# Patient Record
Sex: FEMALE | Race: BLACK OR AFRICAN AMERICAN | NOT HISPANIC OR LATINO
[De-identification: names, ages, dates, MRNs, and addresses within clinical notes are randomized per-mention and may not be internally consistent; named-entity substitution may affect disease eponyms.]

---

## 2017-03-02 ENCOUNTER — RX RENEWAL (OUTPATIENT)
Age: 42
End: 2017-03-02

## 2017-03-07 ENCOUNTER — APPOINTMENT (OUTPATIENT)
Dept: CARDIOLOGY | Facility: CLINIC | Age: 42
End: 2017-03-07

## 2017-03-07 ENCOUNTER — NON-APPOINTMENT (OUTPATIENT)
Age: 42
End: 2017-03-07

## 2017-03-07 VITALS — SYSTOLIC BLOOD PRESSURE: 111 MMHG | OXYGEN SATURATION: 96 % | DIASTOLIC BLOOD PRESSURE: 67 MMHG | HEART RATE: 89 BPM

## 2017-03-07 VITALS — BODY MASS INDEX: 27.49 KG/M2 | WEIGHT: 165 LBS | HEIGHT: 65 IN

## 2017-03-09 LAB
ALBUMIN SERPL ELPH-MCNC: 4.3 G/DL
ALP BLD-CCNC: 99 U/L
ALT SERPL-CCNC: 35 U/L
ANION GAP SERPL CALC-SCNC: 22 MMOL/L
AST SERPL-CCNC: 32 U/L
BILIRUB SERPL-MCNC: 0.6 MG/DL
BUN SERPL-MCNC: 11 MG/DL
CALCIUM SERPL-MCNC: 9.9 MG/DL
CHLORIDE SERPL-SCNC: 102 MMOL/L
CHOLEST SERPL-MCNC: 110 MG/DL
CHOLEST/HDLC SERPL: 2.2 RATIO
CK SERPL-CCNC: 89 U/L
CO2 SERPL-SCNC: 19 MMOL/L
CREAT SERPL-MCNC: 0.88 MG/DL
GLUCOSE SERPL-MCNC: 78 MG/DL
HDLC SERPL-MCNC: 51 MG/DL
LDLC SERPL CALC-MCNC: 19 MG/DL
POTASSIUM SERPL-SCNC: 4.1 MMOL/L
PROT SERPL-MCNC: 7.4 G/DL
SODIUM SERPL-SCNC: 143 MMOL/L
TRIGL SERPL-MCNC: 201 MG/DL

## 2017-07-28 ENCOUNTER — RX RENEWAL (OUTPATIENT)
Age: 42
End: 2017-07-28

## 2017-11-26 ENCOUNTER — RX RENEWAL (OUTPATIENT)
Age: 42
End: 2017-11-26

## 2018-01-30 ENCOUNTER — RX RENEWAL (OUTPATIENT)
Age: 43
End: 2018-01-30

## 2018-02-22 ENCOUNTER — RX RENEWAL (OUTPATIENT)
Age: 43
End: 2018-02-22

## 2018-03-20 ENCOUNTER — NON-APPOINTMENT (OUTPATIENT)
Age: 43
End: 2018-03-20

## 2018-03-20 ENCOUNTER — APPOINTMENT (OUTPATIENT)
Dept: CARDIOLOGY | Facility: CLINIC | Age: 43
End: 2018-03-20
Payer: MEDICAID

## 2018-03-20 VITALS
OXYGEN SATURATION: 98 % | HEART RATE: 83 BPM | BODY MASS INDEX: 27.49 KG/M2 | DIASTOLIC BLOOD PRESSURE: 82 MMHG | RESPIRATION RATE: 14 BRPM | HEIGHT: 65 IN | SYSTOLIC BLOOD PRESSURE: 114 MMHG | WEIGHT: 165 LBS

## 2018-03-20 DIAGNOSIS — R07.9 CHEST PAIN, UNSPECIFIED: ICD-10-CM

## 2018-03-20 PROCEDURE — 99215 OFFICE O/P EST HI 40 MIN: CPT

## 2018-03-20 PROCEDURE — 93000 ELECTROCARDIOGRAM COMPLETE: CPT

## 2018-03-22 ENCOUNTER — CHART COPY (OUTPATIENT)
Age: 43
End: 2018-03-22

## 2018-03-22 LAB
ALBUMIN SERPL ELPH-MCNC: 4.5 G/DL
ALP BLD-CCNC: 100 U/L
ALT SERPL-CCNC: 25 U/L
ANION GAP SERPL CALC-SCNC: 18 MMOL/L
AST SERPL-CCNC: 18 U/L
BILIRUB SERPL-MCNC: 0.4 MG/DL
BUN SERPL-MCNC: 9 MG/DL
CALCIUM SERPL-MCNC: 10.3 MG/DL
CHLORIDE SERPL-SCNC: 99 MMOL/L
CHOLEST SERPL-MCNC: 215 MG/DL
CHOLEST/HDLC SERPL: 3.9 RATIO
CO2 SERPL-SCNC: 22 MMOL/L
CREAT SERPL-MCNC: 0.8 MG/DL
GLUCOSE SERPL-MCNC: 57 MG/DL
HBA1C MFR BLD HPLC: 5.4 %
HDLC SERPL-MCNC: 55 MG/DL
LDLC SERPL CALC-MCNC: 102 MG/DL
POTASSIUM SERPL-SCNC: 4 MMOL/L
PROT SERPL-MCNC: 8.3 G/DL
SODIUM SERPL-SCNC: 139 MMOL/L
TRIGL SERPL-MCNC: 292 MG/DL
TSH SERPL-ACNC: 0.97 UIU/ML

## 2018-08-28 ENCOUNTER — RX RENEWAL (OUTPATIENT)
Age: 43
End: 2018-08-28

## 2018-09-07 ENCOUNTER — RX RENEWAL (OUTPATIENT)
Age: 43
End: 2018-09-07

## 2018-09-11 ENCOUNTER — CHART COPY (OUTPATIENT)
Age: 43
End: 2018-09-11

## 2018-10-07 ENCOUNTER — RX RENEWAL (OUTPATIENT)
Age: 43
End: 2018-10-07

## 2018-10-09 ENCOUNTER — RX RENEWAL (OUTPATIENT)
Age: 43
End: 2018-10-09

## 2018-10-09 ENCOUNTER — CHART COPY (OUTPATIENT)
Age: 43
End: 2018-10-09

## 2018-10-19 ENCOUNTER — APPOINTMENT (OUTPATIENT)
Dept: CARDIOLOGY | Facility: CLINIC | Age: 43
End: 2018-10-19
Payer: MEDICAID

## 2018-10-19 VITALS
BODY MASS INDEX: 27.99 KG/M2 | WEIGHT: 168 LBS | DIASTOLIC BLOOD PRESSURE: 88 MMHG | OXYGEN SATURATION: 97 % | SYSTOLIC BLOOD PRESSURE: 133 MMHG | HEIGHT: 65 IN | HEART RATE: 72 BPM

## 2018-10-19 PROCEDURE — 93000 ELECTROCARDIOGRAM COMPLETE: CPT

## 2018-10-19 PROCEDURE — 99215 OFFICE O/P EST HI 40 MIN: CPT

## 2018-10-19 RX ORDER — CLOPIDOGREL BISULFATE 75 MG/1
75 TABLET, FILM COATED ORAL DAILY
Qty: 90 | Refills: 3 | Status: DISCONTINUED | COMMUNITY
Start: 2018-02-22 | End: 2018-10-19

## 2019-01-04 ENCOUNTER — RX RENEWAL (OUTPATIENT)
Age: 44
End: 2019-01-04

## 2019-03-19 ENCOUNTER — RX RENEWAL (OUTPATIENT)
Age: 44
End: 2019-03-19

## 2019-06-18 ENCOUNTER — RX RENEWAL (OUTPATIENT)
Age: 44
End: 2019-06-18

## 2019-09-12 ENCOUNTER — RX RENEWAL (OUTPATIENT)
Age: 44
End: 2019-09-12

## 2019-09-16 ENCOUNTER — RX RENEWAL (OUTPATIENT)
Age: 44
End: 2019-09-16

## 2020-09-26 ENCOUNTER — RX RENEWAL (OUTPATIENT)
Age: 45
End: 2020-09-26

## 2021-02-22 NOTE — PHYSICAL EXAM
[General Appearance - Well Developed] : well developed [Normal Appearance] : normal appearance [Well Groomed] : well groomed [General Appearance - Well Nourished] : well nourished [No Deformities] : no deformities [General Appearance - In No Acute Distress] : no acute distress [Normal Oral Mucosa] : normal oral mucosa [No Oral Pallor] : no oral pallor [No Oral Cyanosis] : no oral cyanosis [Normal Jugular Venous A Waves Present] : normal jugular venous A waves present [Normal Jugular Venous V Waves Present] : normal jugular venous V waves present [No Jugular Venous Garcia A Waves] : no jugular venous garcia A waves [Respiration, Rhythm And Depth] : normal respiratory rhythm and effort [Exaggerated Use Of Accessory Muscles For Inspiration] : no accessory muscle use [Auscultation Breath Sounds / Voice Sounds] : lungs were clear to auscultation bilaterally [Heart Rate And Rhythm] : heart rate and rhythm were normal [Heart Sounds] : normal S1 and S2 [Murmurs] : no murmurs present [Abdomen Soft] : soft [Abdomen Tenderness] : non-tender [Abdomen Mass (___ Cm)] : no abdominal mass palpated [Abnormal Walk] : normal gait [Gait - Sufficient For Exercise Testing] : the gait was sufficient for exercise testing [Nail Clubbing] : no clubbing of the fingernails [Cyanosis, Localized] : no localized cyanosis [Petechial Hemorrhages (___cm)] : no petechial hemorrhages [] : no rash [Skin Color & Pigmentation] : normal skin color and pigmentation [No Venous Stasis] : no venous stasis [Skin Lesions] : no skin lesions [No Skin Ulcers] : no skin ulcer [No Xanthoma] : no  xanthoma was observed [Oriented To Time, Place, And Person] : oriented to person, place, and time [Affect] : the affect was normal [Mood] : the mood was normal [No Anxiety] : not feeling anxious

## 2021-02-23 ENCOUNTER — APPOINTMENT (OUTPATIENT)
Dept: CARDIOLOGY | Facility: CLINIC | Age: 46
End: 2021-02-23

## 2021-02-23 NOTE — HISTORY OF PRESENT ILLNESS
[FreeTextEntry1] : Here for followup. Last seen in March 2018. Doing well without complaints. Still smoking, knows she should quit. Taking meds.\par 1. CAD- C in January 2014 that had PCI to pLAD, midLAD and dLAD\par Last nuclear stress test in 2015 with large severe fixed apical defect and preserved EF.\par On ASA and Plavix Can dc plavix since last PCI in 2014 as above\par EKG unchanged. Chest pain free. Condition is stable. Continue current meds\par 2. HTN- on Metoprolol 50 mg BID, Lisinopril 20 mg Daily, Nifedipine 60 mg daily. Condition is stable. Continue present meds\par 3. HLD- //HDL55/[id number]- In March 2018; now on tricor and pravachol for lipid profile. Condition is stable. Continue present meds

## 2021-02-23 NOTE — DISCUSSION/SUMMARY
[FreeTextEntry1] : 43 year old woman with CAD sp MI in 2015, PCI x 3 LAD, preserved EF, Vasopspasm, HTN HLD and active smoking here for followup. Doing well without complaints.\par \par 1. CAD- [facility] in January 2014 that had PCI to pLAD, midLAD and dLAD\par Last nuclear stress test in 2015 with large severe fixed apical defect and preserved EF.\par On ASA and Plavix. Can DC plavix since PCI in 2014\par EKG unchanged. Chest pain free. Condition is stable. Continue current meds\par 2. HTN- on Metoprolol 50 mg BID, Lisinopril 20 mg Daily, Nifedipine 60 mg daily. Condition is stable. Continue present meds\par 3. HLD- //HDL55/[id number]- In March 2018; now on tricor and pravachol for lipid profile. Condition is stable. Continue present meds\par 4. FU in 6 months\par 5. Patient medically optimized for IUD placement please call with questions

## 2021-03-15 NOTE — PHYSICAL EXAM
[General Appearance - Well Developed] : well developed [Normal Appearance] : normal appearance [Well Groomed] : well groomed [General Appearance - Well Nourished] : well nourished [No Deformities] : no deformities [General Appearance - In No Acute Distress] : no acute distress [Normal Oral Mucosa] : normal oral mucosa [No Oral Pallor] : no oral pallor [No Oral Cyanosis] : no oral cyanosis [Normal Jugular Venous A Waves Present] : normal jugular venous A waves present [Normal Jugular Venous V Waves Present] : normal jugular venous V waves present [No Jugular Venous Garcia A Waves] : no jugular venous garcia A waves [Respiration, Rhythm And Depth] : normal respiratory rhythm and effort [Exaggerated Use Of Accessory Muscles For Inspiration] : no accessory muscle use [Auscultation Breath Sounds / Voice Sounds] : lungs were clear to auscultation bilaterally [Heart Rate And Rhythm] : heart rate and rhythm were normal [Heart Sounds] : normal S1 and S2 [Murmurs] : no murmurs present [Abdomen Soft] : soft [Abdomen Tenderness] : non-tender [Abdomen Mass (___ Cm)] : no abdominal mass palpated [Abnormal Walk] : normal gait [Gait - Sufficient For Exercise Testing] : the gait was sufficient for exercise testing [Nail Clubbing] : no clubbing of the fingernails [Cyanosis, Localized] : no localized cyanosis [Petechial Hemorrhages (___cm)] : no petechial hemorrhages [Skin Color & Pigmentation] : normal skin color and pigmentation [] : no rash [No Venous Stasis] : no venous stasis [Skin Lesions] : no skin lesions [No Skin Ulcers] : no skin ulcer [No Xanthoma] : no  xanthoma was observed [Oriented To Time, Place, And Person] : oriented to person, place, and time [Affect] : the affect was normal [Mood] : the mood was normal [No Anxiety] : not feeling anxious

## 2021-03-16 ENCOUNTER — APPOINTMENT (OUTPATIENT)
Dept: CARDIOLOGY | Facility: CLINIC | Age: 46
End: 2021-03-16

## 2021-03-16 NOTE — DISCUSSION/SUMMARY
[FreeTextEntry1] : 43 year old woman with CAD sp MI in 2015, PCI x 3 LAD, preserved EF, Vasopspasm, HTN HLD and active smoking here for followup. Doing well without complaints.\par \par 1. CAD- LHC in January 2014 that had PCI to pLAD, midLAD and dLAD\par Last nuclear stress test in 2015 with large severe fixed apical defect and preserved EF.\par On ASA and Plavix. Can DC plavix since PCI in 2014\par EKG unchanged. Chest pain free. Condition is stable. Continue current meds\par 2. HTN- on Metoprolol 50 mg BID, Lisinopril 20 mg Daily, Nifedipine 60 mg daily. Condition is stable. Continue present meds\par 3. HLD- //[id number]- In March 2018; now on tricor and pravachol for lipid profile. Condition is stable. Continue present meds\par 4. FU in 6 months\par 5. Patient medically optimized for IUD placement please call with questions

## 2021-03-16 NOTE — HISTORY OF PRESENT ILLNESS
[FreeTextEntry1] : Here for followup. Last seen in March 2018. Doing well without complaints. Still smoking, knows she should quit. Taking meds.\par 1. CAD- C in January 2014 that had PCI to pLAD, midLAD and dLAD\par Last nuclear stress test in 2015 with large severe fixed apical defect and preserved EF.\par On ASA and Plavix Can dc plavix since last PCI in 2014 as above\par EKG unchanged. Chest pain free. Condition is stable. Continue current meds\par 2. HTN- on Metoprolol 50 mg BID, Lisinopril 20 mg Daily, Nifedipine 60 mg daily. Condition is stable. Continue present meds\par 3. HLD- //HDL55/[id number]- In March 2018; now on tricor and pravachol for lipid profile. Condition is stable. Continue present meds

## 2021-09-30 ENCOUNTER — RX RENEWAL (OUTPATIENT)
Age: 46
End: 2021-09-30

## 2022-10-27 ENCOUNTER — RX RENEWAL (OUTPATIENT)
Age: 47
End: 2022-10-27

## 2022-11-08 ENCOUNTER — RX RENEWAL (OUTPATIENT)
Age: 47
End: 2022-11-08

## 2022-12-09 ENCOUNTER — RX RENEWAL (OUTPATIENT)
Age: 47
End: 2022-12-09

## 2023-01-30 ENCOUNTER — NON-APPOINTMENT (OUTPATIENT)
Age: 48
End: 2023-01-30

## 2023-01-30 ENCOUNTER — APPOINTMENT (OUTPATIENT)
Dept: CARDIOLOGY | Facility: CLINIC | Age: 48
End: 2023-01-30
Payer: MEDICAID

## 2023-01-30 VITALS
OXYGEN SATURATION: 95 % | DIASTOLIC BLOOD PRESSURE: 85 MMHG | WEIGHT: 189 LBS | SYSTOLIC BLOOD PRESSURE: 136 MMHG | BODY MASS INDEX: 31.49 KG/M2 | HEART RATE: 73 BPM | HEIGHT: 65 IN

## 2023-01-30 DIAGNOSIS — E78.5 HYPERLIPIDEMIA, UNSPECIFIED: ICD-10-CM

## 2023-01-30 DIAGNOSIS — I21.9 ACUTE MYOCARDIAL INFARCTION, UNSPECIFIED: ICD-10-CM

## 2023-01-30 DIAGNOSIS — I10 ESSENTIAL (PRIMARY) HYPERTENSION: ICD-10-CM

## 2023-01-30 DIAGNOSIS — I25.10 ATHEROSCLEROTIC HEART DISEASE OF NATIVE CORONARY ARTERY W/OUT ANGINA PECTORIS: ICD-10-CM

## 2023-01-30 PROCEDURE — 99204 OFFICE O/P NEW MOD 45 MIN: CPT | Mod: 25

## 2023-01-30 PROCEDURE — 93000 ELECTROCARDIOGRAM COMPLETE: CPT

## 2023-01-30 RX ORDER — FENOFIBRATE 145 MG/1
145 TABLET, COATED ORAL DAILY
Qty: 30 | Refills: 1 | Status: DISCONTINUED | COMMUNITY
Start: 2018-03-22 | End: 2023-01-30

## 2023-01-30 RX ORDER — PRAVASTATIN SODIUM 40 MG/1
40 TABLET ORAL
Qty: 90 | Refills: 3 | Status: ACTIVE | COMMUNITY
Start: 2017-03-07 | End: 1900-01-01

## 2023-01-30 NOTE — HISTORY OF PRESENT ILLNESS
[FreeTextEntry1] : Here for followup. Last seen in October 2018. Doing well without complaints. Still smoking, knows she should quit. Taking meds.\par 1. CAD- C in January 2014 that had PCI to pLAD, midLAD and dLAD\par Last nuclear stress test in 2015 with large severe fixed apical defect and preserved EF.\par On ASA \par EKG unchanged. Chest pain free. Condition is stable. Continue current meds\par 2. HTN- on Metoprolol 50 mg BID- was previously on Nifedipine and Lisinopril but self stopped.\par  Condition is stable. Continue present meds\par 3. HLD- Pravachol for lipid profile. Condition is stable. Continue present meds

## 2023-01-30 NOTE — DISCUSSION/SUMMARY
[EKG obtained to assist in diagnosis and management of assessed problem(s)] : EKG obtained to assist in diagnosis and management of assessed problem(s) [FreeTextEntry1] : 48 year old woman with CAD sp MI in 2015, PCI x 3 LAD, preserved EF, vasospasm, HTN HLD and active smoking here for followup. Last seen 2018.\par \par 1. CAD- LHC in January 2014 that had PCI to pLAD, midLAD and dLAD\par Last nuclear stress test in 2015 with large severe fixed apical defect and preserved EF.\par On ASA \par EKG unchanged. Chest pain free. Condition is stable. Continue current meds\par 2. HTN- on Metoprolol 50 mg BID- was previously on Nifedipine and Lisinopril but self stopped.\par  Condition is stable. Continue present meds\par 3. HLD- Pravachol for lipid profile. Condition is stable. Continue present meds; check lipid\par 4. FU in 6 months\par

## 2023-01-31 LAB
ALBUMIN SERPL ELPH-MCNC: 4.4 G/DL
ALP BLD-CCNC: 96 U/L
ALT SERPL-CCNC: 14 U/L
ANION GAP SERPL CALC-SCNC: 13 MMOL/L
AST SERPL-CCNC: 12 U/L
BILIRUB SERPL-MCNC: 0.3 MG/DL
BUN SERPL-MCNC: 10 MG/DL
CALCIUM SERPL-MCNC: 9.8 MG/DL
CHLORIDE SERPL-SCNC: 102 MMOL/L
CHOLEST SERPL-MCNC: 172 MG/DL
CO2 SERPL-SCNC: 24 MMOL/L
CREAT SERPL-MCNC: 0.93 MG/DL
EGFR: 75 ML/MIN/1.73M2
ESTIMATED AVERAGE GLUCOSE: 120 MG/DL
GLUCOSE SERPL-MCNC: 70 MG/DL
HBA1C MFR BLD HPLC: 5.8 %
HDLC SERPL-MCNC: 44 MG/DL
LDLC SERPL CALC-MCNC: 71 MG/DL
NONHDLC SERPL-MCNC: 128 MG/DL
POTASSIUM SERPL-SCNC: 4.7 MMOL/L
PROT SERPL-MCNC: 7 G/DL
SODIUM SERPL-SCNC: 139 MMOL/L
TRIGL SERPL-MCNC: 286 MG/DL

## 2023-07-24 ENCOUNTER — APPOINTMENT (OUTPATIENT)
Dept: CARDIOLOGY | Facility: CLINIC | Age: 48
End: 2023-07-24

## 2023-08-03 ENCOUNTER — RX RENEWAL (OUTPATIENT)
Age: 48
End: 2023-08-03

## 2024-03-25 ENCOUNTER — APPOINTMENT (OUTPATIENT)
Dept: CARDIOLOGY | Facility: CLINIC | Age: 49
End: 2024-03-25

## 2024-04-29 ENCOUNTER — RX RENEWAL (OUTPATIENT)
Age: 49
End: 2024-04-29

## 2024-04-29 RX ORDER — ASPIRIN 81 MG/1
81 TABLET, COATED ORAL DAILY
Qty: 90 | Refills: 2 | Status: ACTIVE | COMMUNITY
Start: 2024-04-29 | End: 1900-01-01

## 2024-06-06 RX ORDER — METOPROLOL SUCCINATE 100 MG/1
100 TABLET, EXTENDED RELEASE ORAL
Qty: 90 | Refills: 0 | Status: ACTIVE | COMMUNITY
Start: 2023-01-08 | End: 1900-01-01

## 2024-07-26 ENCOUNTER — NON-APPOINTMENT (OUTPATIENT)
Age: 49
End: 2024-07-26

## 2024-07-26 ENCOUNTER — APPOINTMENT (OUTPATIENT)
Dept: CARDIOLOGY | Facility: CLINIC | Age: 49
End: 2024-07-26
Payer: MEDICAID

## 2024-07-26 VITALS
HEART RATE: 94 BPM | OXYGEN SATURATION: 90 % | HEIGHT: 65 IN | SYSTOLIC BLOOD PRESSURE: 155 MMHG | DIASTOLIC BLOOD PRESSURE: 93 MMHG

## 2024-07-26 VITALS — WEIGHT: 177 LBS | BODY MASS INDEX: 29.45 KG/M2

## 2024-07-26 DIAGNOSIS — I25.10 ATHEROSCLEROTIC HEART DISEASE OF NATIVE CORONARY ARTERY W/OUT ANGINA PECTORIS: ICD-10-CM

## 2024-07-26 DIAGNOSIS — E78.5 HYPERLIPIDEMIA, UNSPECIFIED: ICD-10-CM

## 2024-07-26 DIAGNOSIS — I10 ESSENTIAL (PRIMARY) HYPERTENSION: ICD-10-CM

## 2024-07-26 DIAGNOSIS — I21.9 ACUTE MYOCARDIAL INFARCTION, UNSPECIFIED: ICD-10-CM

## 2024-07-26 PROCEDURE — 93000 ELECTROCARDIOGRAM COMPLETE: CPT

## 2024-07-26 PROCEDURE — 99214 OFFICE O/P EST MOD 30 MIN: CPT | Mod: 25

## 2024-07-26 RX ORDER — ATORVASTATIN CALCIUM 10 MG/1
10 TABLET, FILM COATED ORAL
Refills: 0 | Status: ACTIVE | COMMUNITY
Start: 2024-07-26

## 2024-07-26 RX ORDER — AMLODIPINE BESYLATE 5 MG/1
5 TABLET ORAL DAILY
Qty: 90 | Refills: 3 | Status: ACTIVE | COMMUNITY
Start: 2024-07-26 | End: 1900-01-01

## 2024-07-26 NOTE — DISCUSSION/SUMMARY
[EKG obtained to assist in diagnosis and management of assessed problem(s)] : EKG obtained to assist in diagnosis and management of assessed problem(s) [FreeTextEntry1] : 49 year old woman with CAD sp MI in 2015, PCI x 3 LAD, preserved EF, vasospasm, HTN HLD and active smoking here for followup. Last seen 2018.  1. CAD- C in January 2014 that had PCI to pLAD, midLAD and dLAD Last nuclear stress test in 2015 with large severe fixed apical defect and preserved EF. On ASA  EKG unchanged. Chest pain free. Condition is stable. Continue current meds 2. HTN- on Metoprolol 50 mg BID- was previously on Nifedipine and Lisinopril but self stopped.  Condition is stable. Continue present meds 3. HLD- Lipitor  Condition is stable. Continue present meds 4. FU in 6 months

## 2024-07-26 NOTE — HISTORY OF PRESENT ILLNESS
[FreeTextEntry1] : Here for followup.  Doing well without complaints. Still smoking, knows she should quit. Taking meds. 1. CAD- [facility] in January 2014 that had PCI to pLAD, midLAD and dLAD Last nuclear stress test in 2015 with large severe fixed apical defect and preserved EF. On ASA  EKG unchanged. Chest pain free. Condition is stable. Continue current meds 2. HTN- on Metoprolol 50 mg BID- was previously on Nifedipine and Lisinopril but self stopped.  Condition is stable. Continue present meds 3. HLD- Lipitor  Condition is stable. Continue present meds

## 2024-07-26 NOTE — PHYSICAL EXAM
[General Appearance - Well Developed] : well developed [Normal Appearance] : normal appearance [Well Groomed] : well groomed [General Appearance - Well Nourished] : well nourished [No Deformities] : no deformities [General Appearance - In No Acute Distress] : no acute distress [Normal Oral Mucosa] : normal oral mucosa [No Oral Pallor] : no oral pallor [No Oral Cyanosis] : no oral cyanosis [Normal Jugular Venous A Waves Present] : normal jugular venous A waves present [Normal Jugular Venous V Waves Present] : normal jugular venous V waves present [No Jugular Venous Garcia A Waves] : no jugular venous garcia A waves [Respiration, Rhythm And Depth] : normal respiratory rhythm and effort [Exaggerated Use Of Accessory Muscles For Inspiration] : no accessory muscle use [Auscultation Breath Sounds / Voice Sounds] : lungs were clear to auscultation bilaterally [Heart Rate And Rhythm] : heart rate and rhythm were normal [Heart Sounds] : normal S1 and S2 [Murmurs] : no murmurs present [Abdomen Soft] : soft [Abdomen Tenderness] : non-tender [Abdomen Mass (___ Cm)] : no abdominal mass palpated [Gait - Sufficient For Exercise Testing] : the gait was sufficient for exercise testing [Abnormal Walk] : normal gait [Nail Clubbing] : no clubbing of the fingernails [Cyanosis, Localized] : no localized cyanosis [Petechial Hemorrhages (___cm)] : no petechial hemorrhages [Skin Color & Pigmentation] : normal skin color and pigmentation [] : no rash [No Venous Stasis] : no venous stasis [Skin Lesions] : no skin lesions [No Skin Ulcers] : no skin ulcer [No Xanthoma] : no  xanthoma was observed [Oriented To Time, Place, And Person] : oriented to person, place, and time [Affect] : the affect was normal [Mood] : the mood was normal [No Anxiety] : not feeling anxious

## 2024-07-26 NOTE — REASON FOR VISIT
[Hyperlipidemia] : hyperlipidemia [Hypertension] : hypertension [Follow-Up - Clinic] : a clinic follow-up of [FreeTextEntry2] : CAD Vasospasm HLD HTN